# Patient Record
Sex: MALE | Race: WHITE | NOT HISPANIC OR LATINO | Employment: OTHER | URBAN - METROPOLITAN AREA
[De-identification: names, ages, dates, MRNs, and addresses within clinical notes are randomized per-mention and may not be internally consistent; named-entity substitution may affect disease eponyms.]

---

## 2017-01-10 ENCOUNTER — GENERIC CONVERSION - ENCOUNTER (OUTPATIENT)
Dept: OTHER | Facility: OTHER | Age: 52
End: 2017-01-10

## 2018-01-10 NOTE — PROGRESS NOTES
Assessment    1  Encounter for preventive health examination (V70 0) (Z00 00)   2  Screening for colon cancer (V76 51) (Z12 11)   3  Screening for diabetes mellitus (V77 1) (Z13 1)   4  Screening for lipoid disorders (V77 91) (Z13 220)    Plan  Need for Tdap vaccination    · Stop: Tdap (Adacel)  Screening for colon cancer    · 2 - Stef[facility]  (Gastroenterology) Physician Referral  Consult  Status: Hold For  - Scheduling  Requested for: [id number]  Care Summary provided  : Yes   · COLONOSCOPY; Status:Active; Requested for:37Nue1968;   Screening for diabetes mellitus    · ([facility]) CMP14+eGFR; Status:Active; Requested for:61Czd7126;   Screening for lipoid disorders    · ([facility]) Lipid Panel; Status:Active; Requested for:05Srg9762;     Discussion/Summary  Impression: health maintenance visit  Currently, he eats a healthy diet and has an adequate exercise regimen  Prostate cancer screening: PSA is not indicated  Testicular cancer screening: testicular cancer screening is not indicated  Colorectal cancer screening: the risks and benefits of colorectal cancer screening were discussed and colonoscopy has been ordered  Screening lab work includes glucose and lipid profile  The risks and benefits of immunizations were discussed and immunizations are up to date  Advice and education were given regarding nutrition and aerobic exercise  Patient discussion: discussed with the patient  Chief Complaint  Pt here today for a yearly physical  NSR since last office visit  History of Present Illness  HM, Adult Male: The patient is being seen for a health maintenance evaluation  General Health: The patient's health since the last visit is described as good  He has regular dental visits  He complains of vision problems  Vision care includes wearing reading glasses  He denies hearing loss  Immunizations status: up to date  Lifestyle:  He consumes a diverse and healthy diet  He exercises regularly   He does not use tobacco  He denies alcohol use  He denies drug use  Reproductive health:  the patient is sexually active  He denies erectile dysfunction  Screening: Prostate cancer screening includes no previous prostate-specific antigen testing  Colorectal cancer screening includes no previous screening  Metabolic screening includes lipid profile performed 2014  risk screening reviewed and current  Review of Systems    Constitutional: no fever and no chills  Eyes: as noted in HPI    ENT: as noted in HPI  Cardiovascular: no chest pain and no palpitations  Respiratory: no shortness of breath and no wheezing  Gastrointestinal: no abdominal pain, no constipation and no blood in stools  Genitourinary: no dysuria  Musculoskeletal: no arthralgias and no myalgias  Integumentary: no rashes  Neurological: no headache  Psychiatric: no anxiety and no depression  Endocrine: no muscle weakness and no erectile dysfunction  Hematologic/Lymphatic: no tendency for easy bleeding  Active Problems    1  Aftercare following surgery of the musculoskeletal system (V58 78) (Z47 89)   2  Anxiety (300 00) (F41 9)   3  DNS (deviated nasal septum) (470) (J34 2)   4  Lumbar radiculopathy (724 4) (M54 16)   5  Nasal cavity polyp (471 0) (J33 0)   6  Obsessive compulsive disorder (300 3) (F42)   7  Panic disorder (300 01) (F41 0)   8  Pure hypercholesterolemia (272 0) (E78 0)   9   Rheumatoid arthritis (714 0) (M06 9)    Past Medical History    · Aftercare following surgery of the musculoskeletal system (V58 78) (Z47 89)   · History of Calf pain (729 5) (M79 669)   · History of Pruritic disorder (698 9) (L29 9)   · History of Seen in hospital outpatient department   · History of Throat pain (784 1) (R07 0)    Surgical History    · History of Appendectomy (47 0)   · History of Resection Of Long Biceps Tendon    Family History  Father    · Family history of Diabetes Mellitus (V18 0)    Social History    · Alcohol Use (History)   · OCCASIONAL   · Caffeine Use   · Cigars (___ A Day) (V15 82)   · Cultural background   · NON-   ·    · Former smoker (O09 84) (Z42 439)   · PT STATED HE QUIT AT THE 12/07/2012 OFFICE VISIT   ·    · Primary spoken language English   · Racial background   · WHITE    Current Meds   1  ALPRAZolam 0 25 MG Oral Tablet; TAKE ONE TABLET BY MOUTH THREE TIMES   DAILY AS NEEDED; Therapy: 26OSN7804 to (Evaluate:02Jun2016)  Requested for: 17TVP8761; Last   Rx:83Ity8526 Ordered   2  Enbrel SureClick 50 MG/ML Subcutaneous Solution Auto-injector; Pt gives himself   weekly injections; Therapy: (Recorded:37Nrt9124) to Recorded   3  Fluticasone Propionate 50 MCG/ACT Nasal Suspension; INSTILL 2 SQUIRT Daily in   each nostril; Therapy: 79POA8131 to (Last Rx:19Dec2014)  Requested for: 68XAB0857 Ordered   4  Viagra 100 MG Oral Tablet; TAKE 1 TABLET DAILY 1 HOUR BEFORE NEEDED; Therapy: 64Mlh6815 to (Psychiatric)  Requested for: 35007 80 22 97; Last   Rx:49Com5413 Ordered    Allergies    1  No Known Drug Allergies    2  No Known Environmental Allergies   3  No Known Food Allergies    Vitals   Recorded: 77QRO5737 01:22PM   Heart Rate 60, L PT   Pulse Quality Normal, L PT   Systolic 964, LUE, Sitting   Diastolic 68, LUE, Sitting   Height 5 ft 10 in   Weight 184 lb    BMI Calculated 26 4   BSA Calculated 2 01     Physical Exam    Constitutional   General appearance: No acute distress, well appearing and well nourished  Ears, Nose, Mouth, and Throat   Otoscopic examination: Tympanic membranes translucent with normal light reflex  Canals patent without erythema  Oropharynx: Normal with no erythema, edema, exudate or lesions  Neck   Neck: Supple, symmetric, trachea midline, no masses  Thyroid: Normal, no thyromegaly  Pulmonary   Auscultation of lungs: Clear to auscultation  Cardiovascular   Auscultation of heart: Normal rate and rhythm, normal S1 and S2, no murmurs      Abdomen   Abdomen: Non-tender, no masses  Lymphatic   Palpation of lymph nodes in neck: No lymphadenopathy  Musculoskeletal   Gait and station: Normal     Psychiatric   Orientation to person, place and time: Normal     Mood and affect: Normal        Results/Data  PHQ-2 Adult Depression Screening 39OUS0192 01:26PM User, Wolfgang     Test Name Result Flag Reference   PHQ-2 Adult Depression Score 0     Over the last two weeks, how often have you been bothered by any of the following problems?   Little interest or pleasure in doing things: Not at all - 0  Feeling down, depressed, or hopeless: Not at all - 0   PHQ-2 Adult Depression Screening Negative         Signatures   Electronically signed by : KOFI Samayoa ; May 16 2016  1:43PM EST                       (Author)

## 2021-07-24 ENCOUNTER — OFFICE VISIT (OUTPATIENT)
Dept: URGENT CARE | Facility: CLINIC | Age: 56
End: 2021-07-24
Payer: COMMERCIAL

## 2021-07-24 VITALS
BODY MASS INDEX: 25.76 KG/M2 | OXYGEN SATURATION: 98 % | RESPIRATION RATE: 18 BRPM | HEIGHT: 71 IN | TEMPERATURE: 97.5 F | HEART RATE: 76 BPM | WEIGHT: 184 LBS

## 2021-07-24 DIAGNOSIS — J06.9 ACUTE URI: Primary | ICD-10-CM

## 2021-07-24 PROCEDURE — 99214 OFFICE O/P EST MOD 30 MIN: CPT | Performed by: PHYSICIAN ASSISTANT

## 2021-07-24 PROCEDURE — 87635 SARS-COV-2 COVID-19 AMP PRB: CPT | Performed by: PHYSICIAN ASSISTANT

## 2021-07-24 RX ORDER — ETANERCEPT 50 MG/ML
SOLUTION SUBCUTANEOUS
COMMUNITY

## 2021-07-24 RX ORDER — ALPRAZOLAM 0.25 MG/1
TABLET ORAL
COMMUNITY
Start: 2021-07-02

## 2021-07-24 RX ORDER — BROMPHENIRAMINE MALEATE, PSEUDOEPHEDRINE HYDROCHLORIDE, AND DEXTROMETHORPHAN HYDROBROMIDE 2; 30; 10 MG/5ML; MG/5ML; MG/5ML
5 SYRUP ORAL 3 TIMES DAILY PRN
Qty: 120 ML | Refills: 0 | Status: SHIPPED | OUTPATIENT
Start: 2021-07-24

## 2021-07-24 RX ORDER — IBUPROFEN 600 MG/1
TABLET ORAL
COMMUNITY
Start: 2021-07-15

## 2021-07-24 NOTE — PATIENT INSTRUCTIONS
Covid 19 results will return in a 3-5 days  We will call you with both negative or positive results  Prophylactically self quarantine  Department of health's newest recommendations state patient should self quarantine for 10 days since symptom onset or 24 hours fever free without the use of fever reducing drugs (Tylenol and ibuprofen), whichever is longer AND overall improvement of symptoms  Drink lots of fluids to maintain hydration  Take Vitamin C, D, and Zinc to boost the immune system  Do not touch your face, wash hands often, and practice social distancing  Call your PCP if you have any questions or concerns  Go to ER if having severe symptoms such as chest pain, shortness of breath, or fever that is not responding to antipyretics  Cold Symptoms   WHAT YOU NEED TO KNOW:   A cold is an infection caused by a virus  The infection causes your upper respiratory system to become inflamed  Common symptoms of a cold include sneezing, dry throat, a stuffy nose, headache, watery eyes, and a cough  Your cough may be dry, or you may cough up mucus  You may also have muscle aches, joint pain, and tiredness  Rarely, you may have a fever  Most colds go away without treatment  DISCHARGE INSTRUCTIONS:   Return to the emergency department if:   · You have increased tiredness and weakness  · You are unable to eat  · Your heart is beating much faster than usual for you  · You see white spots in the back of your throat and your neck is swollen and sore to the touch  · You see pinpoint or larger reddish-purple dots on your skin  Contact your healthcare provider if:   · You have a fever higher than 102°F (38 9°C)  · You have new or worsening shortness of breath  · You have thick nasal drainage for more than 2 days  · Your symptoms do not improve or get worse within 5 days  · You have questions or concerns about your condition or care  Medicines:   The following medicines may be suggested by your healthcare provider to decrease your cold symptoms  These medicines are available without a doctor's order  Ask which medicines to take and when to take them  Follow directions  · NSAIDs or acetaminophen  help to bring down a fever or decrease pain  · Decongestants  help decrease nasal stuffiness  · Antihistamines  help decrease sneezing and a runny nose  · Cough suppressants  help decrease how much you cough  · Expectorants  help loosen mucus so you can cough it up  · Take your medicine as directed  Contact your healthcare provider if you think your medicine is not helping or if you have side effects  Tell him of her if you are allergic to any medicine  Keep a list of the medicines, vitamins, and herbs you take  Include the amounts, and when and why you take them  Bring the list or the pill bottles to follow-up visits  Carry your medicine list with you in case of an emergency  Symptom relief: The following may help relieve cold symptoms, such as a dry throat and congestion:  · Gargle with mouthwash or warm salt water as directed  · Suck on throat lozenges or hard candy  · Use a cold or warm vaporizer or humidifier to ease your breathing  · Rest for at least 2 days and then as needed to decrease tiredness and weakness  · Use petroleum based jelly around your nostrils to decrease irritation from blowing your nose  Drink liquids:  Liquids will help thin and loosen thick mucus so you can cough it up  Liquids will also keep you hydrated  Ask your healthcare provider which liquids are best for you and how much to drink each day  Prevent the spread of germs: You can spread your cold germs to others for at least 3 days after your symptoms start  Wash your hands often  Do not share items, such as eating utensils  Cover your nose and mouth when you cough or sneeze using the crook of your elbow instead of your hands  Throw used tissues in the garbage    Do not smoke:  Smoking may worsen your symptoms and increase the length of time you feel sick  Talk with your healthcare provider if you need help to stop smoking  Follow up with your healthcare provider as directed:  Write down your questions so you remember to ask them during your visits  © Copyright [employer] 2021 Information is for End User's use only and may not be sold, redistributed or otherwise used for commercial purposes  All illustrations and images included in CareNotes® are the copyrighted property of [employer]  or Alondra Saenz  The above information is an  only  It is not intended as medical advice for individual conditions or treatments  Talk to your doctor, nurse or pharmacist before following any medical regimen to see if it is safe and effective for you

## 2021-07-24 NOTE — PROGRESS NOTES
[address] [facility]        NAME: Edison Chu is a 60 y o  male  : 1965    MRN: [id number]  DATE: 2021  TIME: 1:37 PM    Assessment and Plan   Acute URI [J06 9]  1  Acute URI  brompheniramine-pseudoephedrine-DM 30-2-10 MG/5ML syrup    Novel Coronavirus (Covid-19),PCR [facility] [place] - Office Collection         Patient Instructions     Take bromphed as prescribed  Avoid taking with additional sudafed  Covid 19 results will return in a 3-5 days  We will call you with both negative or positive results  Prophylactically self quarantine  Drink lots of fluids to maintain hydration  Take Vitamin C, D, and Zinc to boost the immune system  Call your PCP if you have any questions or concerns  Go to ER if having severe symptoms such as chest pain, shortness of breath, or fever that is not responding to antipyretics  Chief Complaint     Chief Complaint   Patient presents with    Nasal Congestion     x 2 days along with a "slight" sore throat  States now has a cough in chest          History of Present Illness       Patient is concerned he has COVID-19  Patient is unvaccinated  Denies recent exposure to individual who has COVID-19   Denies recent travel     URI   This is a new problem  The current episode started in the past 7 days (3 days)  The problem has been unchanged  There has been no fever  Associated symptoms include congestion, coughing, diarrhea and nausea  Pertinent negatives include no abdominal pain, chest pain, dysuria, ear pain, headaches, neck pain, plugged ear sensation, rash, rhinorrhea, sinus pain, sneezing, sore throat, vomiting or wheezing  Treatments tried: antihistamines, nyquil, dayquil  The treatment provided mild relief  Review of Systems   Review of Systems   Constitutional: Negative for chills and fever  HENT: Positive for congestion  Negative for ear pain, rhinorrhea, sinus pain, sneezing and sore throat  Respiratory: Positive for cough  Negative for wheezing  Cardiovascular: Negative for chest pain  Gastrointestinal: Positive for diarrhea and nausea  Negative for abdominal pain and vomiting  Genitourinary: Negative for dysuria  Musculoskeletal: Negative for neck pain  Skin: Negative for rash  Neurological: Negative for headaches  Current Medications       Current Outpatient Medications:     ALPRAZolam (XANAX) 0 25 mg tablet, TAKE 1 TABLET (0 25 MG TOTAL) BY MOUTH 2 (TWO) TIMES A DAY AS NEEDED FOR ANXIETY , Disp: , Rfl:     brompheniramine-pseudoephedrine-DM 30-2-10 MG/5ML syrup, Take 5 mL by mouth 3 (three) times a day as needed for congestion or cough, Disp: 120 mL, Rfl: 0    etanercept (Enbrel SureClick) 50 MG/ML injection, Inject under the skin, Disp: , Rfl:     ibuprofen (MOTRIN) 600 mg tablet, TAKE 1 TABLET (600 MG TOTAL) BY MOUTH EVERY 6 (SIX) HOURS AS NEEDED FOR MILD PAIN (PAIN SCORE 1 3)  , Disp: , Rfl:     Current Allergies     Allergies as of 07/24/2021    (No Known Allergies)            The following portions of the patient's history were reviewed and updated as appropriate: allergies, current medications, past family history, past medical history, past social history, past surgical history and problem list      No past medical history on file  No past surgical history on file  No family history on file  Medications have been verified  Objective   Pulse 76   Temp 97 5 °F (36 4 °C)   Resp 18   Ht 5' 10 5" (1 791 m)   Wt 83 5 kg (184 lb)   SpO2 98%   BMI 26 03 kg/m²   No LMP for male patient  Physical Exam     Physical Exam  Vitals and nursing note reviewed  Constitutional:       Appearance: Normal appearance  HENT:      Head: Normocephalic and atraumatic  Right Ear: Tympanic membrane normal       Left Ear: Tympanic membrane normal       Nose: Congestion and rhinorrhea present  Mouth/Throat:      Mouth: Mucous membranes are moist       Pharynx: Posterior oropharyngeal erythema present   No oropharyngeal exudate  Comments: +PND  Cardiovascular:      Rate and Rhythm: Normal rate and regular rhythm  Pulses: Normal pulses  Heart sounds: Normal heart sounds  No murmur heard  No friction rub  No gallop  Pulmonary:      Effort: Pulmonary effort is normal  No respiratory distress  Breath sounds: Normal breath sounds  No wheezing or rhonchi  Musculoskeletal:      Cervical back: Normal range of motion  Lymphadenopathy:      Cervical: No cervical adenopathy  Skin:     General: Skin is warm  Findings: No erythema or rash  Neurological:      General: No focal deficit present  Mental Status: He is alert and oriented to person, place, and time     Psychiatric:         Mood and Affect: Mood normal          Behavior: Behavior normal

## 2021-07-25 ENCOUNTER — TELEPHONE (OUTPATIENT)
Dept: URGENT CARE | Facility: CLINIC | Age: 56
End: 2021-07-25

## 2021-07-25 LAB — SARS-COV-2 RNA RESP QL NAA+PROBE: NEGATIVE

## 2022-05-23 ENCOUNTER — TELEPHONE (OUTPATIENT)
Dept: DERMATOLOGY | Facility: CLINIC | Age: 57
End: 2022-05-23

## 2022-05-23 NOTE — TELEPHONE ENCOUNTER
KAITY recd; Nicholas Hooper calling for [employer]  I just received a call about rescheduling his appointment for next Tuesday, the 31st  If someone could call me back, please at [phone]       Returned call no answer lvm for them to give us a call back to reschedule

## 2022-06-30 ENCOUNTER — CONSULT (OUTPATIENT)
Dept: DERMATOLOGY | Facility: CLINIC | Age: 57
End: 2022-06-30
Payer: COMMERCIAL

## 2022-06-30 VITALS — TEMPERATURE: 97.4 F | WEIGHT: 183 LBS | BODY MASS INDEX: 25.89 KG/M2

## 2022-06-30 DIAGNOSIS — Z12.83 SCREENING FOR MALIGNANT NEOPLASM OF SKIN: Primary | ICD-10-CM

## 2022-06-30 DIAGNOSIS — L57.0 LICHENOID KERATOSIS: ICD-10-CM

## 2022-06-30 PROCEDURE — 99202 OFFICE O/P NEW SF 15 MIN: CPT | Performed by: DERMATOLOGY

## 2022-06-30 NOTE — PROGRESS NOTES
[facility] Note     Patient Name: Bradley Greenfield  Encounter Date: 06/30/22     Have you been cared for by a [facility] D[facility]atologist in the last 3 years and, if so, which one? No    · Have you traveled outside of the [address] [place] [address] in the past 3 months or outside of the [facility] area in the last 2 weeks? No     May we call your Preferred Phone number to discuss your specific medical information? Yes     May we leave a detailed message that includes your specific medical information? Yes      Today's Chief Concerns:   Concern #1:  Skin check      Past Medical History:  Have you personally ever had or currently have any of the following? · Skin cancer (such as Melanoma, Basal Cell Carcinoma, Squamous Cell Carcinoma? (If Yes, please provide more detail)- No  · Eczema: No  · Psoriasis: no  · HIV/AIDS:   · Hepatitis B or C:   · Tuberculosis: No  · Systemic Immunosuppression such as Diabetes, Biologic or Immunotherapy, Chemotherapy, Organ Transplantation, Bone Marrow Transplantation (If YES, please provide more detail): No  · Radiation Treatment (If YES, please provide more detail): No  · Any other major medical conditions/concerns? (If Yes, which types)- No    Social History:     What is/was your primary occupation?  What are your hobbies/past-times? Family History:  Have any of your "first degree relatives" (parent, brother, sister, or child) had any of the following       · Skin cancer such as Melanoma or Merkel Cell Carcinoma or Pancreatic Cancer? No  · Eczema, Asthma, Hay Fever or Seasonal Allergies: No  · Psoriasis or Psoriatic Arthritis: No  · Do any other medical conditions seem to run in your family? If Yes, what condition and which relatives?   YES, father and paternal aunt and uncle has hx of diabetes    Current Medications:         Current Outpatient Medications:     ALPRAZolam (XANAX) 0 25 mg tablet, TAKE 1 TABLET (0 25 MG TOTAL) BY MOUTH 2 (TWO) TIMES A DAY AS NEEDED FOR ANXIETY , Disp: , Rfl:     ibuprofen (MOTRIN) 600 mg tablet, TAKE 1 TABLET (600 MG TOTAL) BY MOUTH EVERY 6 (SIX) HOURS AS NEEDED FOR MILD PAIN (PAIN SCORE 1 3)  , Disp: , Rfl:     ascorbic acid (VITAMIN C) 1000 MG tablet, Take 1 mg by mouth daily, Disp: , Rfl:     brompheniramine-pseudoephedrine-DM 30-2-10 MG/5ML syrup, Take 5 mL by mouth 3 (three) times a day as needed for congestion or cough (Patient not taking: Reported on 6/30/2022), Disp: 120 mL, Rfl: 0    etanercept (Enbrel SureClick) 50 MG/ML injection, Inject under the skin (Patient not taking: Reported on 6/30/2022), Disp: , Rfl:     Multiple Vitamin (MULTIVITAMIN ADULT PO), Take 1 mg by mouth, Disp: , Rfl:       Review of Systems:  Have you recently had or currently have any of the following? If YES, what are you doing for the problem? · Fever, chills or unintended weight loss: No  · Sudden loss or change in your vision: No  · Nausea, vomiting or blood in your stool: No  · Painful or swollen joints: No  · Wheezing or cough: No  · Changing mole or non-healing wound: No  · Nosebleeds: No  · Excessive sweating: No  · Easy or prolonged bleeding? No  · Over the last 2 weeks, how often have you been bothered by the following problems? · Taking little interest or pleasure in doing things: 1 - Not at All  · Feeling down, depressed, or hopeless: 1 - Not at All  · Rapid heartbeat with epinephrine:  No    · Any known allergies? · No Known Allergies      Physical Exam:     Was a chaperone (Derm Clinical Assistant) present throughout the entire Physical Exam? Yes     Did the Dermatology Team specifically  the patient on the importance of a Full Skin Exam to be sure that nothing is missed clinically?  Yes}  o Did the patient ultimately request or accept a Full Skin Exam?  Yes  o Did the patient specifically refuse to have the areas "under-the-bra" examined by the Dermatologist? No  o Did the patient specifically refuse to have the areas "under-the-underwear" examined by the Dermatologist? No    CONSTITUTIONAL:   Vitals:    06/30/22 1547   Temp: (!) 97 4 °F (36 3 °C)   TempSrc: Temporal   Weight: 83 kg (183 lb)       PSYCH: Normal mood and affect  EYES: Normal conjunctiva  ENT: Normal lips and oral mucosa  CARDIOVASCULAR: No edema  RESPIRATORY: Normal respirations  HEME/LYMPH/IMMUNO:  No regional lymphadenopathy except as noted below in "ASSESSMENT AND PLAN BY DIAGNOSIS"    SKIN:  FULL ORGAN SYSTEM EXAM   Hair, Scalp, Ears, Face Normal except as noted below in Assessment   Neck, Cervical Chain Nodes Normal except as noted below in Assessment   Right Arm/Hand/Fingers Normal except as noted below in Assessment   Left Arm/Hand/Fingers Normal except as noted below in Assessment   Chest/Breasts/Axillae Viewed areas Normal except as noted below in Assessment   Abdomen, Umbilicus Normal except as noted below in Assessment   Back/Spine Normal except as noted below in Assessment   Groin/Genitalia/Buttocks    Right Leg, Foot, Toes Normal except as noted below in Assessment   Left Leg, Foot, Toes Normal except as noted below in Assessment        Assessment and Plan by Diagnosis:    History of Present Condition:     Duration:  How long has this been an issue for you?    o  about 1 month   Location Affected:  Where on the body is this affecting you?    o  left inguinal crease   Quality:  Is there any bleeding, pain, itch, burning/irritation, or redness associated with the skin lesion? o  denies   Severity:  Describe any bleeding, pain, itch, burning/irritation, or redness on a scale of 1 to 10 (with 10 being the worst)    o  0   Timing:  Does this condition seem to be there pretty constantly or do you notice it more at specific times throughout the day?    o  not sure   Context:  Have you ever noticed that this condition seems to be associated with specific activities you do?    o  denies   Modifying Factors:    o Anything that seems to make the condition worse?    -  denies  o What have you tried to do to make the condition better?    -  denies   Associated Signs and Symptoms:  Does this skin lesion seem to be associated with any of the following:      SCREENING SKIN EXAMINATION    Physical Exam:   Anatomic Location Affected: Integument   Morphological Description:     Pertinent Positives:   Pertinent Negatives:  No abnormal skin lesions     Melanocytic Nevi  Melanocytic nevi ("moles") are caused by collections of the color producing skin cells, or melanocytes, in 1 area in the skin  They can range in color from pink to dark brown and be either raised or flat  Some moles are present at birth (I e , "congenital nevi"), while others come up later in life (i e , "acquired nevi")  [place] exposure also stimulates the body to make more moles, ie the more sun you get the more moles you'll grow  Clinically distinguishing a healthy mole from melanoma may be difficult  The "ABCDE's" of moles have been suggested as a means of helping to alert a person to a suspicious mole and the possible increased risk of melanoma  Asymmetry: Healthy moles tend to be symmetric, while melanomas are often asymmetric  Asymmetry means if you draw a line through the mole, the two halves do not match in color, size, shape, or surface texture Any mole that starts to demonstrate "asymmetry" should be examined promptly by a board certified dermatologist      Border: Healthy moles tend to have discrete, even borders  The border of a melanoma often blends into the normal skin and does not sharply delineate the mole from normal skin  Any mole that starts to demonstrate "uneven borders" should be examined promptly     Color: Healthy moles tend to be one color throughout  Melanomas tend to be made up of different colors ranging from dark black, blue, white, or red    Any mole that demonstrates a color change should be examined promptly    Diameter: Healthy moles tend to be smaller than 0 6 cm in size; an exception are "congenital nevi" that can be larger  Melanomas tend to grow and can often be greater than 0 6 cm (1/4 of an inch, or the size of a pencil eraser)  This is only a guideline, and many normal moles may be larger than 0 6 cm without being unhealthy  Any mole that starts to change in size (small to bigger or bigger to smaller) should be examined promptly    Evolving: Healthy moles tend to "stay the same "  Melanomas may often show signs of change or evolution such as a change in size, shape, color, or elevation  Any mole that starts to itch, bleed, crust, burn, hurt, or ulcerate or demonstrate a change or evolution should be examined promptly by a board certified dermatologist       What are atypical moles or dysplastic nevi? Dysplastic moles are moles that have some of the ABCDE  changes listed above but  are not cancerous  Sometimes a biopsy and microscopic examination are needed to determine the difference  They may indicate an increased risk of melanoma in that person, especially if there is a family history of melanoma  What is a Melanoma? The main concern when looking at a new or changing mole it to evaluate whether it may be a melanoma  The appearance of a "new mole" remains one of the most reliable methods for identifying a malignant melanoma  A melanoma is a type of skin cancer that can be deadly if it spreads throughout the body  The prognosis of a Melanoma depends on how deep it has penetrated in the skin  If caught early, they generally will not have had time to grow into the deeper layers of the skin and they cure rate is then very high  Once the melanoma grows deeper into the skin, the cure rate drops dramatically  Therefore, early detection and removal of a malignant melanoma results in a much better chance of complete cure       LICHENOID KERATOSIS ("BENIGN LICHENOID KERATOSIS")    Physical Exam:   Anatomic Location Affected:  Left groin   Morphological Description:  2 pink macules   Pertinent Positives:   Pertinent Negatives: Additional History of Present Condition:  Present for about 3 weeks; doesn't bother patient    Assessment and Plan:  Based on a thorough discussion of this condition and the management approach to it (including a comprehensive discussion of the known risks, side effects and potential benefits of treatment), the patient (family) agrees to implement the following specific plan:   Will monitor    What is lichenoid keratosis? Lichenoid keratosis is usually a small, solitary, inflamed macule or thin pigmented plaque  Multiple eruptive lichenoid keratoses in sun-exposed sites are also described  Their colour varies from an initial reddish brown to a greyish purple/brown as the lesion resolves several weeks or months later  Lichenoid keratosis is also known as benign lichenoid keratosis, solitary lichen planus, lichen planus-like keratosis and involuting lichenoid plaque  They are one of the causes of atypical solar lentigo  Who gets lichenoid keratosis? Lichenoid keratosis generally develops in fair-skinned patients aged 27-80 years  It is twice as common in females as than males  It is most commonly seen in Caucasians and rarely affects Asians,  Americans or Hispanics  What causes lichenoid keratosis? Lichenoid keratosis is an inflammatory reaction arising in a regressing existing solar lentigo or seborrhoeic keratosis  It is not known what triggers the reaction, but triggers include minor trauma such as friction, drugs, dermatitis, and sun exposure  How is lichenoid keratosis diagnosed? Lichenoid keratosis is diagnosed by its clinical and dermoscopic appearance, which reveals uniform clusters of grey dots and, depending on the stage of the lesion, may show signs of an original pre-existing lentigo or seborrhoeic keratosis  In time, signs of the original lesion disappear   Later on the grey dots also disappear, as the lesion resolves to reveal normal skin  Because clinical examination and dermatoscopy may not be able to differentiate between lichenoid keratosis and other solitary erythematous lesions that could be melanocytic, non-melanocytic benign, malignant or inflammatory, a punch or shave skin biopsy may be necessary  Histopathology of resembles that of lichen planus or lichenoid drug eruption, with some slight differences  Remnants of the original solar lentigo or seborrhoeic keratosis may be evident  What are the clinical features of lichenoid keratosis? Classic, bullous, or atypical   Acute rapidly developing lesion (present for <3 months)   Erythematous or pinkish papule or plaque   Dermoscopy may show remnants of pigment network, subtle blotches of brown colour, clusters of grey dots plus dotted, irregular linear and other shaped telangiectatic blood vessels  Early or interface subtype   Subacute lesions present for 3 months to one year   Erythematous to dusky-red or hyper-pigmented brown lesion   Depending on the age of lesion, dermoscopy may show features of a solar lentigo or flat seborrhoeic keratosis with moth-eaten borders, fingerprinting, milia-like cysts, comedo-like openings, plus small foci of melanophages (grey dots)  Late regressed or atrophic subtype   Lesions have been present for more than one year   May be violaceous (violet-coloured) papules or irregularly distributed lesions with shades of brown or grey  Other features of lichenoid keratosis are:   A solitary lesion is present in 39% of cases of lichenoid keratosis, with other patients presenting with few to many lesions   It is most commonly found on the upper trunk, followed by the distal upper extremities, and less commonly on the head and neck   Size ranges from a few millimetres to one centimetre or more in size   The skin surface may be smooth, scaly or warty     The lesion is often symptomless or it may be itchy or have a mild stinging sensation  What is the management of lichenoid keratosis? Lichenoid keratosis is harmless and resolves spontaneously  If there is any doubt about the diagnosis, dermatoscopic digital images can be taken and used in follow-up a few months later  Lichenoid keratosis can be removed if desired by liquid nitrogen, electrosurgery or curettage  Multiple eruptive lichenoid keratoses may be effectively treated with the oral retinoid, acitretin  To date there have been no reports of lichenoid keratosis turning into malignant skin tumours      Chica Boswell MD  Scribe Attestation    I,:  Jewels Mantilla am acting as a scribe while in the presence of the attending physician :       I,:  Rosio Bhardwaj MD personally performed the services described in this documentation    as scribed in my presence :

## 2022-06-30 NOTE — PATIENT INSTRUCTIONS
MELANOCYTIC NEVI ("Moles")    Assessment and Plan:  Based on a thorough discussion of this condition and the management approach to it (including a comprehensive discussion of the known risks, side effects and potential benefits of treatment), the patient (family) agrees to implement the following specific plan:  Reassured benign     Melanocytic Nevi  Melanocytic nevi ("moles") are caused by collections of the color producing skin cells, or melanocytes, in 1 area in the skin  They can range in color from pink to dark brown and be either raised or flat  Some moles are present at birth (I e , "congenital nevi"), while others come up later in life (i e , "acquired nevi")  [place] exposure also stimulates the body to make more moles, ie the more sun you get the more moles you'll grow  Clinically distinguishing a healthy mole from melanoma may be difficult  The "ABCDE's" of moles have been suggested as a means of helping to alert a person to a suspicious mole and the possible increased risk of melanoma  Asymmetry: Healthy moles tend to be symmetric, while melanomas are often asymmetric  Asymmetry means if you draw a line through the mole, the two halves do not match in color, size, shape, or surface texture Any mole that starts to demonstrate "asymmetry" should be examined promptly by a board certified dermatologist      Border: Healthy moles tend to have discrete, even borders  The border of a melanoma often blends into the normal skin and does not sharply delineate the mole from normal skin  Any mole that starts to demonstrate "uneven borders" should be examined promptly     Color: Healthy moles tend to be one color throughout  Melanomas tend to be made up of different colors ranging from dark black, blue, white, or red    Any mole that demonstrates a color change should be examined promptly    Diameter: Healthy moles tend to be smaller than 0 6 cm in size; an exception are "congenital nevi" that can be larger  Melanomas tend to grow and can often be greater than 0 6 cm (1/4 of an inch, or the size of a pencil eraser)  This is only a guideline, and many normal moles may be larger than 0 6 cm without being unhealthy  Any mole that starts to change in size (small to bigger or bigger to smaller) should be examined promptly    Evolving: Healthy moles tend to "stay the same "  Melanomas may often show signs of change or evolution such as a change in size, shape, color, or elevation  Any mole that starts to itch, bleed, crust, burn, hurt, or ulcerate or demonstrate a change or evolution should be examined promptly by a board certified dermatologist       What are atypical moles or dysplastic nevi? Dysplastic moles are moles that have some of the ABCDE  changes listed above but  are not cancerous  Sometimes a biopsy and microscopic examination are needed to determine the difference  They may indicate an increased risk of melanoma in that person, especially if there is a family history of melanoma  What is a Melanoma? The main concern when looking at a new or changing mole it to evaluate whether it may be a melanoma  The appearance of a "new mole" remains one of the most reliable methods for identifying a malignant melanoma  A melanoma is a type of skin cancer that can be deadly if it spreads throughout the body  The prognosis of a Melanoma depends on how deep it has penetrated in the skin  If caught early, they generally will not have had time to grow into the deeper layers of the skin and they cure rate is then very high  Once the melanoma grows deeper into the skin, the cure rate drops dramatically  Therefore, early detection and removal of a malignant melanoma results in a much better chance of complete cure       LICHENOID KERATOSIS ("BENIGN LICHENOID KERATOSIS")    Assessment and Plan:  Based on a thorough discussion of this condition and the management approach to it (including a comprehensive discussion of the known risks, side effects and potential benefits of treatment), the patient (family) agrees to implement the following specific plan: Will monitor    What is lichenoid keratosis? Lichenoid keratosis is usually a small, solitary, inflamed macule or thin pigmented plaque  Multiple eruptive lichenoid keratoses in sun-exposed sites are also described  Their colour varies from an initial reddish brown to a greyish purple/brown as the lesion resolves several weeks or months later  Lichenoid keratosis is also known as benign lichenoid keratosis, solitary lichen planus, lichen planus-like keratosis and involuting lichenoid plaque  They are one of the causes of atypical solar lentigo  Who gets lichenoid keratosis? Lichenoid keratosis generally develops in fair-skinned patients aged 27-80 years  It is twice as common in females as than males  It is most commonly seen in Caucasians and rarely affects Asians,  Americans or Hispanics  What causes lichenoid keratosis? Lichenoid keratosis is an inflammatory reaction arising in a regressing existing solar lentigo or seborrhoeic keratosis  It is not known what triggers the reaction, but triggers include minor trauma such as friction, drugs, dermatitis, and sun exposure  How is lichenoid keratosis diagnosed? Lichenoid keratosis is diagnosed by its clinical and dermoscopic appearance, which reveals uniform clusters of grey dots and, depending on the stage of the lesion, may show signs of an original pre-existing lentigo or seborrhoeic keratosis  In time, signs of the original lesion disappear  Later on the grey dots also disappear, as the lesion resolves to reveal normal skin    Because clinical examination and dermatoscopy may not be able to differentiate between lichenoid keratosis and other solitary erythematous lesions that could be melanocytic, non-melanocytic benign, malignant or inflammatory, a punch or shave skin biopsy may be necessary  Histopathology of resembles that of lichen planus or lichenoid drug eruption, with some slight differences  Remnants of the original solar lentigo or seborrhoeic keratosis may be evident  What are the clinical features of lichenoid keratosis? Classic, bullous, or atypical  Acute rapidly developing lesion (present for <3 months)  Erythematous or pinkish papule or plaque  Dermoscopy may show remnants of pigment network, subtle blotches of brown colour, clusters of grey dots plus dotted, irregular linear and other shaped telangiectatic blood vessels  Early or interface subtype  Subacute lesions present for 3 months to one year  Erythematous to dusky-red or hyper-pigmented brown lesion  Depending on the age of lesion, dermoscopy may show features of a solar lentigo or flat seborrhoeic keratosis with moth-eaten borders, fingerprinting, milia-like cysts, comedo-like openings, plus small foci of melanophages (grey dots)  Late regressed or atrophic subtype  Lesions have been present for more than one year  May be violaceous (violet-coloured) papules or irregularly distributed lesions with shades of brown or grey  Other features of lichenoid keratosis are:  A solitary lesion is present in 71% of cases of lichenoid keratosis, with other patients presenting with few to many lesions  It is most commonly found on the upper trunk, followed by the distal upper extremities, and less commonly on the head and neck  Size ranges from a few millimetres to one centimetre or more in size  The skin surface may be smooth, scaly or warty  The lesion is often symptomless or it may be itchy or have a mild stinging sensation  What is the management of lichenoid keratosis? Lichenoid keratosis is harmless and resolves spontaneously  If there is any doubt about the diagnosis, dermatoscopic digital images can be taken and used in follow-up a few months later    Lichenoid keratosis can be removed if desired by liquid nitrogen, electrosurgery or curettage  Multiple eruptive lichenoid keratoses may be effectively treated with the oral retinoid, acitretin  To date there have been no reports of lichenoid keratosis turning into malignant skin tumours